# Patient Record
Sex: FEMALE | Race: WHITE | NOT HISPANIC OR LATINO | Employment: PART TIME | URBAN - METROPOLITAN AREA
[De-identification: names, ages, dates, MRNs, and addresses within clinical notes are randomized per-mention and may not be internally consistent; named-entity substitution may affect disease eponyms.]

---

## 2018-05-03 ENCOUNTER — OFFICE VISIT (OUTPATIENT)
Dept: GASTROENTEROLOGY | Facility: CLINIC | Age: 73
End: 2018-05-03
Payer: MEDICARE

## 2018-05-03 ENCOUNTER — TELEPHONE (OUTPATIENT)
Dept: GASTROENTEROLOGY | Facility: CLINIC | Age: 73
End: 2018-05-03

## 2018-05-03 VITALS
DIASTOLIC BLOOD PRESSURE: 73 MMHG | WEIGHT: 172.38 LBS | BODY MASS INDEX: 30.54 KG/M2 | SYSTOLIC BLOOD PRESSURE: 156 MMHG | HEIGHT: 63 IN | HEART RATE: 76 BPM

## 2018-05-03 DIAGNOSIS — R13.14 DYSPHAGIA, PHARYNGOESOPHAGEAL: Primary | ICD-10-CM

## 2018-05-03 PROCEDURE — 99204 OFFICE O/P NEW MOD 45 MIN: CPT | Mod: S$GLB,,, | Performed by: INTERNAL MEDICINE

## 2018-05-03 PROCEDURE — 99999 PR PBB SHADOW E&M-NEW PATIENT-LVL III: CPT | Mod: PBBFAC,,, | Performed by: INTERNAL MEDICINE

## 2018-05-03 NOTE — PROGRESS NOTES
Subjective:      Patient ID: Karin Alliosn is a 72 y.o. female.    Chief Complaint: Dysphagia    HPI:   Patient is 72-year-old female referred for GI evaluation.  She gives a history of intermittent solid food dysphagia for some time.  Heartburn is occasional only.  On no acid reducing agents.  GI systems review otherwise negative.  No prior upper endoscopy.  Indicates she had a colonoscopy in 2014 does not recall the physician's name.  Past medical history includes breast cancer treated with surgery, chemotherapy and radiation in 2014.   Hypothyroidism.  Prior hysterectomy.  Nonsmoker.  Nondrinker.  Family history negative for GI neoplasm.    Review of patient's allergies indicates:  No Known Allergies  Past Medical History:   Diagnosis Date    Breast cancer, right breast     Cancer     Lymphedema     Thyroid disease      Past Surgical History:   Procedure Laterality Date    BREAST SURGERY      MASTECTOMY       Family History   Problem Relation Age of Onset    No Known Problems Mother     No Known Problems Father      Social History     Social History    Marital status:      Spouse name: N/A    Number of children: N/A    Years of education: N/A     Occupational History    Not on file.     Social History Main Topics    Smoking status: Never Smoker    Smokeless tobacco: Not on file    Alcohol use 1.0 oz/week     2 drink(s) per week      Comment: occasionally    Drug use: No    Sexual activity: Not on file     Other Topics Concern    Not on file     Social History Narrative    No narrative on file       Review of Systems:  Constitutional: Negative for appetite change.   HENT: Negative for trouble swallowing.   Eyes: Negative for photophobia.   Respiratory: Negative for cough and shortness of breath.   Cardiovascular: Negative for palpitations.   Gastrointestinal: See HPI for details.  Genitourinary: Negative for frequency and hematuria.   Skin: Negative for rash.   Neurological: Negative  "for weakness and headaches.   Hematological: Negative.   Psychiatric/Behavioral: Negative for suicidal ideas and behavioral problems.     Objective:     BP (!) 156/73 (BP Location: Right arm, Patient Position: Sitting)   Pulse 76   Ht 5' 3" (1.6 m)   Wt 78.2 kg (172 lb 6.4 oz)   BMI 30.54 kg/m²     Physical Exam:  Alert no distress.  A limited historian.  Eyes: Pupils are equal, round, and reactive to light.   Neck: Supple. No mass  Cardiovascular: Regular rhythm . No murmur   Pulmonary/Chest: Lungs clear   Abdominal: Soft. No mass palpated. Nontender, no guarding. Positive bowel sounds   Musculoskeletal: No deformity. No edema.   Psychiatric: Alert and oriented    Assessment:     1. Dysphagia, pharyngoesophageal      Plan:     Karin was seen today for dysphagia.    Diagnoses and all orders for this visit:    Dysphagia, pharyngoesophageal  -     ranitidine (ZANTAC) 300 MG tablet; Take 1 tablet (300 mg total) by mouth every evening.  -     Case request GI: ESOPHAGOGASTRODUODENOSCOPY (EGD)      Plan:  Empiric H2 blocker  EGD with esophageal biopsy and dilatation  Patient requests to wait until next month    CC Dr. Bey    "

## 2018-05-03 NOTE — TELEPHONE ENCOUNTER
Patient is scheduled for an EGD at [facility] on 6/18/18,prep instructions was explained and given at office visit. Case request was placed by  and Jose G was notified to had pt in the book.

## 2018-05-03 NOTE — PATIENT INSTRUCTIONS
Treating Dysphagia     Talk to your healthcare provider before you take any medicines.    A medical evaluation helps your healthcare provider find the cause of your trouble swallowing, or dysphagia. Your evaluation may include a medical history and some special tests. Your provider will make a treatment plan based on the results of your evaluation. You may need to take medicines. In some cases, your provider may suggest a procedure to stretch or widen your esophagus (esophageal dilation). Or your provider may suggest surgery.  What you can do  To help control dysphagia, follow your treatment plan. Take all medicines as directed. You also can help lessen your dysphagia symptoms by being careful about what and how you eat.  Medicines  You may need medicines, such as those that:  · Reduce or neutralize stomach acids  · Control esophagus muscle spasms  · Treat an allergic disorder of the esophagus that is causing the problem  · Are injected into the esophagus to help symptoms  Esophageal dilation  Dilation is a procedure that your provider can use to widen your esophagus. It is most often done when a narrowing (stricture) of the esophagus is causing the problem. There are many ways your provider can widen your esophagus. He or she can discuss them with you.  Eating tips  · Eat slowly in a relaxed setting.  · Dont talk while you eat.  · Take small bites and chew slowly and thoroughly  · Sit in an upright position during and after meals.  · Ask your provider about any special diets that may help, such as liquid diets.  · If you have trouble swallowing solid foods, you can use a  to mash or purée them.  · Thicken liquids with milk, juice, broth, gravy, or starch to make swallowing easier.  Occupational or speech therapy  Your healthcare provider may recommend that you have an evaluation or sessions with a speech or occupational therapist. These specialists in dysphagia may give you exercises and instructions to  help you eat safely.   Date Last Reviewed: 10/1/2016  © 7253-2616 [employer] [address] [place] [address], [place] [address]. All rights reserved. This information is not intended as a substitute for professional medical care. Always follow your healthcare professional's instructions.

## 2018-06-06 ENCOUNTER — TELEPHONE (OUTPATIENT)
Dept: GASTROENTEROLOGY | Facility: CLINIC | Age: 73
End: 2018-06-06

## 2018-06-06 NOTE — TELEPHONE ENCOUNTER
----- Message from Iliana Ac MA sent at 6/6/2018  2:56 PM CDT -----  Contact: [phone]/self  Pt was scheduled for the 18th , tried calling back to rescheduled but was not able to reach pt. Can you please CX. thanks  ----- Message -----  From: Monique Tafoya  Sent: 6/6/2018   2:41 PM  To: Nayana Naranjo ([place])    Patient is requesting a call back regarding canceling her procedure. She recently had rotator cuff surgery and would like to reschedule later next month. Please advise.

## 2021-01-14 ENCOUNTER — IMMUNIZATION (OUTPATIENT)
Dept: INTERNAL MEDICINE | Facility: CLINIC | Age: 76
End: 2021-01-14
Payer: MEDICARE

## 2021-01-14 DIAGNOSIS — Z23 NEED FOR VACCINATION: ICD-10-CM

## 2021-01-14 PROCEDURE — 91300 COVID-19, MRNA, LNP-S, PF, 30 MCG/0.3 ML DOSE VACCINE: CPT | Mod: PBBFAC | Performed by: INTERNAL MEDICINE

## 2021-02-06 ENCOUNTER — IMMUNIZATION (OUTPATIENT)
Dept: INTERNAL MEDICINE | Facility: CLINIC | Age: 76
End: 2021-02-06
Payer: MEDICARE

## 2021-02-06 DIAGNOSIS — Z23 NEED FOR VACCINATION: Primary | ICD-10-CM

## 2021-02-06 PROCEDURE — 0002A COVID-19, MRNA, LNP-S, PF, 30 MCG/0.3 ML DOSE VACCINE: CPT | Mod: CV19,,, | Performed by: INTERNAL MEDICINE

## 2021-02-06 PROCEDURE — 91300 COVID-19, MRNA, LNP-S, PF, 30 MCG/0.3 ML DOSE VACCINE: ICD-10-PCS | Mod: ,,, | Performed by: INTERNAL MEDICINE

## 2021-02-06 PROCEDURE — 0002A COVID-19, MRNA, LNP-S, PF, 30 MCG/0.3 ML DOSE VACCINE: ICD-10-PCS | Mod: CV19,,, | Performed by: INTERNAL MEDICINE

## 2021-02-06 PROCEDURE — 91300 COVID-19, MRNA, LNP-S, PF, 30 MCG/0.3 ML DOSE VACCINE: CPT | Mod: ,,, | Performed by: INTERNAL MEDICINE

## 2022-01-02 ENCOUNTER — HOSPITAL ENCOUNTER (EMERGENCY)
Facility: HOSPITAL | Age: 77
Discharge: HOME | End: 2022-01-02
Attending: EMERGENCY MEDICINE
Payer: MEDICARE

## 2022-01-02 VITALS
RESPIRATION RATE: 18 BRPM | SYSTOLIC BLOOD PRESSURE: 160 MMHG | TEMPERATURE: 98 F | HEART RATE: 88 BPM | DIASTOLIC BLOOD PRESSURE: 72 MMHG | OXYGEN SATURATION: 99 %

## 2022-01-02 DIAGNOSIS — Z71.89 ADVICE GIVEN ABOUT COVID-19 VIRUS INFECTION: Primary | ICD-10-CM

## 2022-01-02 PROCEDURE — 99281 EMR DPT VST MAYX REQ PHY/QHP: CPT | Mod: ER

## 2022-01-02 NOTE — ED PROVIDER NOTES
"Encounter Date: 1/2/2022       History     Chief Complaint   Patient presents with    COVID-19 Concerns     "My  has a sore throat and I thought maybe I should get a covid test" Denies any symptoms at this time     HPI: Karin Allison, a 76 y.o. female  has a past medical history of Breast cancer, right breast, Cancer, Lymphedema, and Thyroid disease.     She presents to the ED requesting covid test.  States that her  has a sore throat and he is also checking in for same concern.  She denies symptoms.  She is vaccinated without booster.          The history is provided by the patient.     Review of patient's allergies indicates:  No Known Allergies  Past Medical History:   Diagnosis Date    Breast cancer, right breast     Cancer     Lymphedema     Thyroid disease      Past Surgical History:   Procedure Laterality Date    BREAST SURGERY      MASTECTOMY       Family History   Problem Relation Age of Onset    No Known Problems Mother     No Known Problems Father      Social History     Tobacco Use    Smoking status: Never Smoker   Substance Use Topics    Alcohol use: Yes     Alcohol/week: 1.7 standard drinks     Types: 2 drink(s) per week     Comment: occasionally    Drug use: No     Review of Systems   Constitutional: Negative for fever.   HENT: Negative for congestion.    Respiratory: Negative for cough.    Cardiovascular: Negative for chest pain.   Musculoskeletal: Negative for myalgias.   Skin: Negative for color change.   Allergic/Immunologic: Negative for immunocompromised state.   All other systems reviewed and are negative.      Physical Exam     Initial Vitals [01/02/22 1527]   BP Pulse Resp Temp SpO2   (!) 160/72 88 18 98.1 °F (36.7 °C) 99 %      MAP       --         Physical Exam    Nursing note and vitals reviewed.  Constitutional: She appears well-developed and well-nourished. She is not diaphoretic. No distress.   HENT:   Head: Normocephalic and atraumatic.   Right Ear: External " ear normal.   Left Ear: External ear normal.   Nose: Nose normal.   Eyes: Conjunctivae and EOM are normal.   Neck:   Normal range of motion.  Cardiovascular: Normal rate and regular rhythm.   Pulmonary/Chest: No respiratory distress.   Musculoskeletal:         General: Normal range of motion.      Cervical back: Normal range of motion.     Neurological: She is alert and oriented to person, place, and time.   Skin: No rash noted.   Psychiatric: She has a normal mood and affect. Thought content normal.         ED Course   Procedures  Labs Reviewed - No data to display       Imaging Results    None          Medications - No data to display  Medical Decision Making:   Initial Assessment:   Covid concern   ED Management:  Patient was seen in the Emergency Department with symptoms consistent with a viral respiratory illness.  Based on the most recent recommendations from our hospital administration/infectious disease department at this time, the patient does NOT meet criteria for COVID-19 testing. This was explained to the patient. Vital signs do not indicate sepsis, hypoxia nor respiratory distress, and in my professional opinion the patient is well enough for discharge home. The patient was provided with discharge instructions on self-care and how to quarantine at home. I reinforced this advice and the dangers to family and public with failure to comply. We will proceed with symptomatic treatment. The patient was also given a return to work note, if applicable. Return precautions discussed with the patient. The patient expressed understanding to my instructions.                         Clinical Impression:   Final diagnoses:  [Z71.89] Advice given about COVID-19 virus infection (Primary)          ED Disposition Condition    Discharge Stable        ED Prescriptions     None        Follow-up Information     Follow up With Specialties Details Why Contact Info    Julianne Bey MD Endocrinology   [address] [place] [address]  [address]  [facility] [address]  [phone]             María Solis PA-C  01/02/22 7910

## 2022-10-01 ENCOUNTER — HOSPITAL ENCOUNTER (EMERGENCY)
Facility: HOSPITAL | Age: 77
Discharge: HOME | End: 2022-10-01
Attending: EMERGENCY MEDICINE
Payer: MEDICARE

## 2022-10-01 VITALS
SYSTOLIC BLOOD PRESSURE: 175 MMHG | HEART RATE: 76 BPM | RESPIRATION RATE: 18 BRPM | TEMPERATURE: 98 F | WEIGHT: 162 LBS | BODY MASS INDEX: 29.81 KG/M2 | DIASTOLIC BLOOD PRESSURE: 75 MMHG | HEIGHT: 62 IN | OXYGEN SATURATION: 99 %

## 2022-10-01 DIAGNOSIS — R58 ECCHYMOSIS: ICD-10-CM

## 2022-10-01 DIAGNOSIS — M79.605 LEFT LEG PAIN: Primary | ICD-10-CM

## 2022-10-01 DIAGNOSIS — M79.606 LEG PAIN: ICD-10-CM

## 2022-10-01 LAB
ALBUMIN SERPL BCP-MCNC: 4 G/DL (ref 3.5–5.2)
ALP SERPL-CCNC: 81 U/L (ref 38–126)
ALT SERPL W/O P-5'-P-CCNC: 17 U/L (ref 10–44)
ANION GAP SERPL CALC-SCNC: 8 MMOL/L (ref 8–16)
APTT BLDCRRT: 25.9 SEC (ref 21–32)
AST SERPL-CCNC: 18 U/L (ref 15–46)
BASOPHILS # BLD AUTO: 0.04 K/UL (ref 0–0.2)
BASOPHILS NFR BLD: 0.5 % (ref 0–1.9)
BILIRUB SERPL-MCNC: 0.6 MG/DL (ref 0.1–1)
CALCIUM SERPL-MCNC: 9 MG/DL (ref 8.7–10.5)
CHLORIDE SERPL-SCNC: 106 MMOL/L (ref 95–110)
CO2 SERPL-SCNC: 26 MMOL/L (ref 23–29)
CREAT SERPL-MCNC: 0.78 MG/DL (ref 0.5–1.4)
DIFFERENTIAL METHOD: ABNORMAL
EOSINOPHIL # BLD AUTO: 0.3 K/UL (ref 0–0.5)
EOSINOPHIL NFR BLD: 3.2 % (ref 0–8)
ERYTHROCYTE [DISTWIDTH] IN BLOOD BY AUTOMATED COUNT: 12.8 % (ref 11.5–14.5)
EST. GFR  (NO RACE VARIABLE): >60 ML/MIN/1.73 M^2
GLUCOSE SERPL-MCNC: 130 MG/DL (ref 70–110)
HCT VFR BLD AUTO: 34.4 % (ref 37–48.5)
HGB BLD-MCNC: 11 G/DL (ref 12–16)
IMM GRANULOCYTES # BLD AUTO: 0.02 K/UL (ref 0–0.04)
IMM GRANULOCYTES NFR BLD AUTO: 0.3 % (ref 0–0.5)
INR PPP: 1 (ref 0.8–1.2)
LYMPHOCYTES # BLD AUTO: 1.5 K/UL (ref 1–4.8)
LYMPHOCYTES NFR BLD: 19.3 % (ref 18–48)
MCH RBC QN AUTO: 29.5 PG (ref 27–31)
MCHC RBC AUTO-ENTMCNC: 32 G/DL (ref 32–36)
MCV RBC AUTO: 92 FL (ref 82–98)
MONOCYTES # BLD AUTO: 0.8 K/UL (ref 0.3–1)
MONOCYTES NFR BLD: 9.5 % (ref 4–15)
NEUTROPHILS # BLD AUTO: 5.3 K/UL (ref 1.8–7.7)
NEUTROPHILS NFR BLD: 67.2 % (ref 38–73)
NRBC BLD-RTO: 0 /100 WBC
PLATELET # BLD AUTO: 302 K/UL (ref 150–450)
PMV BLD AUTO: 10.2 FL (ref 9.2–12.9)
POTASSIUM SERPL-SCNC: 4.1 MMOL/L (ref 3.5–5.1)
PROT SERPL-MCNC: 7 G/DL (ref 6–8.4)
PROTHROMBIN TIME: 10.5 SEC (ref 9–12.5)
RBC # BLD AUTO: 3.73 M/UL (ref 4–5.4)
SODIUM SERPL-SCNC: 140 MMOL/L (ref 136–145)
UUN UR-MCNC: 30 MG/DL (ref 7–17)
WBC # BLD AUTO: 7.89 K/UL (ref 3.9–12.7)

## 2022-10-01 PROCEDURE — 85025 COMPLETE CBC W/AUTO DIFF WBC: CPT | Mod: ER | Performed by: EMERGENCY MEDICINE

## 2022-10-01 PROCEDURE — 85730 THROMBOPLASTIN TIME PARTIAL: CPT | Mod: ER | Performed by: EMERGENCY MEDICINE

## 2022-10-01 PROCEDURE — 80053 COMPREHEN METABOLIC PANEL: CPT | Mod: ER | Performed by: EMERGENCY MEDICINE

## 2022-10-01 PROCEDURE — 99284 EMERGENCY DEPT VISIT MOD MDM: CPT | Mod: 25,ER

## 2022-10-01 PROCEDURE — 85610 PROTHROMBIN TIME: CPT | Mod: ER | Performed by: EMERGENCY MEDICINE

## 2022-10-01 NOTE — ED PROVIDER NOTES
Encounter Date: 10/1/2022       History     Chief Complaint   Patient presents with    Leg Pain     Pt c/o LLE pain x 1 week. Bruising noted to LLE from knee ankle, pt states began on Sunday. Denies injury.      76-year-old female presents with left leg pain since Monday.  Patient noticed significant bruising to the left leg around that time.  No known trauma.  Patient was seen in urgent care Wednesday and x-rays were reportedly normal.  Patient continues to have pain.  She denies chest pain or shortness of breath, bruising in other locations.  No history of bleeding disorders.  Patient is not anticoagulated.    Review of patient's allergies indicates:  No Known Allergies  Past Medical History:   Diagnosis Date    Breast cancer, right breast     Cancer     Lymphedema     Thyroid disease      Past Surgical History:   Procedure Laterality Date    BREAST SURGERY      MASTECTOMY       Family History   Problem Relation Age of Onset    No Known Problems Mother     No Known Problems Father      Social History     Tobacco Use    Smoking status: Never   Substance Use Topics    Alcohol use: Yes     Alcohol/week: 1.7 standard drinks     Types: 2 drink(s) per week     Comment: occasionally    Drug use: No     Review of Systems   Constitutional:  Negative for fever.   Eyes:  Negative for pain.   Respiratory:  Negative for shortness of breath.    Cardiovascular:  Negative for chest pain.   Gastrointestinal:  Negative for abdominal pain, nausea and vomiting.   Genitourinary:  Negative for difficulty urinating.   Musculoskeletal:  Negative for back pain and neck pain.   Neurological:  Negative for headaches.   Hematological:  Bruises/bleeds easily.   Psychiatric/Behavioral:  Negative for confusion.      Physical Exam     Initial Vitals [10/01/22 1021]   BP Pulse Resp Temp SpO2   (!) 141/65 93 18 98.4 °F (36.9 °C) 100 %      MAP       --         Physical Exam    Nursing note and vitals reviewed.  HENT:   Head: Atraumatic.   Eyes:  EOM are normal.   Neck:   Normal range of motion.  Cardiovascular:  Intact distal pulses.     Exam reveals no gallop and no friction rub.       No murmur heard.  Pulmonary/Chest: Breath sounds normal. No respiratory distress. She has no wheezes. She has no rhonchi. She has no rales.   Abdominal: Abdomen is soft. Bowel sounds are normal. She exhibits no distension. There is no abdominal tenderness.   Musculoskeletal:         General: Normal range of motion.      Cervical back: Normal range of motion.     Neurological: She is alert and oriented to person, place, and time.   Skin:        Ecchymosis and tenderness to the left mid leg without deformity   Psychiatric: She has a normal mood and affect.       ED Course   Procedures  Labs Reviewed   CBC W/ AUTO DIFFERENTIAL - Abnormal; Notable for the following components:       Result Value    RBC 3.73 (*)     Hemoglobin 11.0 (*)     Hematocrit 34.4 (*)     All other components within normal limits   COMPREHENSIVE METABOLIC PANEL - Abnormal; Notable for the following components:    Glucose 130 (*)     BUN 30 (*)     All other components within normal limits   PROTIME-INR   APTT          Imaging Results              US Lower Extremity Veins Left (Final result)  Result time 10/01/22 11:57:46      Final result by Vincent Graves MD (10/01/22 11:57:46)                   Impression:      No evidence of deep venous thrombosis in the left lower extremity.    Pulsatile venous flow may correlate with elevated right heart pressures.      Electronically signed by: Vincent Graves  Date:    10/01/2022  Time:    11:57               Narrative:    EXAMINATION:  US LOWER EXTREMITY VEINS LEFT    CLINICAL HISTORY:  Pain in leg, unspecified    TECHNIQUE:  Duplex and color flow Doppler evaluation and graded compression of the left lower extremity veins was performed.    COMPARISON:  None    FINDINGS:  Left thigh veins: The common femoral, femoral, popliteal, upper greater saphenous, and deep  femoral veins are patent and free of thrombus. The veins are normally compressible.  There is pulsatile flow.    Left calf veins: The visualized calf veins are patent.    Contralateral CFV: The contralateral (right) common femoral vein is patent and free of thrombus.    Miscellaneous: None                                       Medications - No data to display  Medical Decision Making:   Initial Assessment:   76-year-old female presenting for bruising/pain to the left leg.  No known trauma.  X-rays recently normal.  Pulses intact.  Patient has ecchymosis noted to the left mid lower leg with mild tenderness.  No bony deformities.  DVT study negative.  Labs show no significant anemia, thrombocytopenia or coagulopathy.  Etiology of bruising is not clear at this time.  Recommend supportive care and follow up with primary care.  May need repeat ultrasound if symptoms persist.                        Clinical Impression:   Final diagnoses:  [M79.606] Leg pain  [M79.605] Left leg pain (Primary)  [R58] Ecchymosis        ED Disposition Condition    Discharge Stable          ED Prescriptions    None       Follow-up Information       Follow up With Specialties Details Why Contact Info    Julianne Bey MD Endocrinology Schedule an appointment as soon as possible for a visit   [address] [facility] [address]  [facility] [address]  [phone]               Zhou Enriquez MD  10/01/22 9763

## 2022-10-01 NOTE — ED NOTES
Attempted to obtain IV access on left AC and left forearm, unsuccessful. Pt tolerated well. Sites covered with gauze and coband. No redness swelling or drainage noted.

## 2024-06-09 ENCOUNTER — HOSPITAL ENCOUNTER (EMERGENCY)
Facility: HOSPITAL | Age: 79
Discharge: HOME | End: 2024-06-10
Attending: EMERGENCY MEDICINE

## 2024-06-09 DIAGNOSIS — R00.0 SINUS TACHYCARDIA: Primary | ICD-10-CM

## 2024-06-09 DIAGNOSIS — R00.2 PALPITATIONS: ICD-10-CM

## 2024-06-09 PROCEDURE — 99285 EMERGENCY DEPT VISIT HI MDM: CPT | Mod: 25,ER

## 2024-06-09 PROCEDURE — 93010 ELECTROCARDIOGRAM REPORT: CPT | Mod: ,,, | Performed by: INTERNAL MEDICINE

## 2024-06-09 PROCEDURE — 93005 ELECTROCARDIOGRAM TRACING: CPT | Mod: ER

## 2024-06-09 RX ORDER — LYSINE HCL 500 MG
2 TABLET ORAL
COMMUNITY

## 2024-06-09 RX ORDER — FOSINOPIRL SODIUM 10 MG/1
10 TABLET ORAL
COMMUNITY
Start: 2024-05-23

## 2024-06-10 VITALS
BODY MASS INDEX: 28.7 KG/M2 | HEIGHT: 63 IN | SYSTOLIC BLOOD PRESSURE: 163 MMHG | RESPIRATION RATE: 18 BRPM | DIASTOLIC BLOOD PRESSURE: 73 MMHG | OXYGEN SATURATION: 100 % | HEART RATE: 92 BPM | WEIGHT: 162 LBS | TEMPERATURE: 99 F

## 2024-06-10 LAB
ALBUMIN SERPL BCP-MCNC: 4.1 G/DL (ref 3.5–5.2)
ALP SERPL-CCNC: 125 U/L (ref 38–126)
ALT SERPL W/O P-5'-P-CCNC: 19 U/L (ref 10–44)
AMPHET+METHAMPHET UR QL: NEGATIVE
ANION GAP SERPL CALC-SCNC: 9 MMOL/L (ref 8–16)
AST SERPL-CCNC: 22 U/L (ref 15–46)
BARBITURATES UR QL SCN>200 NG/ML: NEGATIVE
BASOPHILS # BLD AUTO: 0.03 K/UL (ref 0–0.2)
BASOPHILS NFR BLD: 0.4 % (ref 0–1.9)
BENZODIAZ UR QL SCN>200 NG/ML: NEGATIVE
BILIRUB SERPL-MCNC: 0.3 MG/DL (ref 0.1–1)
BZE UR QL SCN: NEGATIVE
CALCIUM SERPL-MCNC: 9.1 MG/DL (ref 8.7–10.5)
CANNABINOIDS UR QL SCN: NEGATIVE
CHLORIDE SERPL-SCNC: 107 MMOL/L (ref 95–110)
CO2 SERPL-SCNC: 25 MMOL/L (ref 23–29)
CREAT SERPL-MCNC: 0.85 MG/DL (ref 0.5–1.4)
CREAT UR-MCNC: 12.2 MG/DL (ref 15–325)
D DIMER PPP IA.FEU-MCNC: 0.6 MG/L FEU
DIFFERENTIAL METHOD BLD: NORMAL
EOSINOPHIL # BLD AUTO: 0.3 K/UL (ref 0–0.5)
EOSINOPHIL NFR BLD: 4.5 % (ref 0–8)
ERYTHROCYTE [DISTWIDTH] IN BLOOD BY AUTOMATED COUNT: 13.6 % (ref 11.5–14.5)
EST. GFR  (NO RACE VARIABLE): >60 ML/MIN/1.73 M^2
ETHANOL SERPL-MCNC: <10 MG/DL
GLUCOSE SERPL-MCNC: 113 MG/DL (ref 70–110)
HCT VFR BLD AUTO: 38.9 % (ref 37–48.5)
HGB BLD-MCNC: 12.6 G/DL (ref 12–16)
IMM GRANULOCYTES # BLD AUTO: 0.01 K/UL (ref 0–0.04)
IMM GRANULOCYTES NFR BLD AUTO: 0.1 % (ref 0–0.5)
LYMPHOCYTES # BLD AUTO: 2.6 K/UL (ref 1–4.8)
LYMPHOCYTES NFR BLD: 37.3 % (ref 18–48)
MCH RBC QN AUTO: 29.2 PG (ref 27–31)
MCHC RBC AUTO-ENTMCNC: 32.4 G/DL (ref 32–36)
MCV RBC AUTO: 90 FL (ref 82–98)
METHADONE UR QL SCN>300 NG/ML: NEGATIVE
MONOCYTES # BLD AUTO: 0.7 K/UL (ref 0.3–1)
MONOCYTES NFR BLD: 10.2 % (ref 4–15)
NEUTROPHILS # BLD AUTO: 3.4 K/UL (ref 1.8–7.7)
NEUTROPHILS NFR BLD: 47.5 % (ref 38–73)
NRBC BLD-RTO: 0 /100 WBC
NT-PROBNP SERPL-MCNC: 518 PG/ML (ref 5–1800)
OPIATES UR QL SCN: NEGATIVE
PCP UR QL SCN>25 NG/ML: NEGATIVE
PLATELET # BLD AUTO: 295 K/UL (ref 150–450)
PMV BLD AUTO: 10.2 FL (ref 9.2–12.9)
POTASSIUM SERPL-SCNC: 4.3 MMOL/L (ref 3.5–5.1)
PROT SERPL-MCNC: 7.7 G/DL (ref 6–8.4)
RBC # BLD AUTO: 4.31 M/UL (ref 4–5.4)
SODIUM SERPL-SCNC: 141 MMOL/L (ref 136–145)
T4 FREE SERPL-MCNC: 1.23 NG/DL (ref 0.71–1.51)
TOXICOLOGY INFORMATION: ABNORMAL
TROPONIN I SERPL-MCNC: 0.02 NG/ML (ref 0.01–0.03)
TSH SERPL DL<=0.005 MIU/L-ACNC: 0.12 UIU/ML (ref 0.4–4)
UUN UR-MCNC: 28 MG/DL (ref 7–17)
WBC # BLD AUTO: 7.07 K/UL (ref 3.9–12.7)

## 2024-06-10 PROCEDURE — 25000003 PHARM REV CODE 250: Mod: ER | Performed by: EMERGENCY MEDICINE

## 2024-06-10 PROCEDURE — 84439 ASSAY OF FREE THYROXINE: CPT | Performed by: EMERGENCY MEDICINE

## 2024-06-10 PROCEDURE — 85025 COMPLETE CBC W/AUTO DIFF WBC: CPT | Mod: ER | Performed by: EMERGENCY MEDICINE

## 2024-06-10 PROCEDURE — 96360 HYDRATION IV INFUSION INIT: CPT | Mod: ER

## 2024-06-10 PROCEDURE — 84443 ASSAY THYROID STIM HORMONE: CPT | Mod: ER | Performed by: EMERGENCY MEDICINE

## 2024-06-10 PROCEDURE — 25500020 PHARM REV CODE 255: Mod: ER | Performed by: EMERGENCY MEDICINE

## 2024-06-10 PROCEDURE — 80053 COMPREHEN METABOLIC PANEL: CPT | Mod: ER | Performed by: EMERGENCY MEDICINE

## 2024-06-10 PROCEDURE — 84484 ASSAY OF TROPONIN QUANT: CPT | Mod: ER | Performed by: EMERGENCY MEDICINE

## 2024-06-10 PROCEDURE — 85379 FIBRIN DEGRADATION QUANT: CPT | Mod: ER | Performed by: EMERGENCY MEDICINE

## 2024-06-10 PROCEDURE — 82077 ASSAY SPEC XCP UR&BREATH IA: CPT | Mod: ER | Performed by: EMERGENCY MEDICINE

## 2024-06-10 PROCEDURE — 80307 DRUG TEST PRSMV CHEM ANLYZR: CPT | Mod: ER | Performed by: EMERGENCY MEDICINE

## 2024-06-10 PROCEDURE — 83880 ASSAY OF NATRIURETIC PEPTIDE: CPT | Mod: ER | Performed by: EMERGENCY MEDICINE

## 2024-06-10 RX ADMIN — IOHEXOL 100 ML: 350 INJECTION, SOLUTION INTRAVENOUS at 02:06

## 2024-06-10 RX ADMIN — SODIUM CHLORIDE 500 ML: 9 INJECTION, SOLUTION INTRAVENOUS at 12:06

## 2024-06-10 NOTE — ED PROVIDER NOTES
"ED Provider Note - 6/9/2024    History     Chief Complaint   Patient presents with    Palpitations     Pt to the Er states she, "could feel her heart beating." Spouse checked BP at home and it was 160's. Denies chest pain or SOB     Patient currently presents with concern regarding palpitations.  Onset was 1st noted just prior to arrival.  Symptoms are described as racing.  There is not associated CP.  There is associated SOB.  This is not a recurrent event.  Patient denies history of SVT or atrial fibrillation.  Patient does have a history of hypothyroidism for which she takes thyroid replacement.  Patient has a remote history of breast cancer but is no longer actively undergoing treatment.  Patient does note that she drank a few martinis earlier this evening and does drink tea regularly throughout the day.        Review of patient's allergies indicates:   Allergen Reactions    Peanut Other (See Comments)     Past Medical History:   Diagnosis Date    Breast cancer, right breast     Cancer     Lymphedema     Thyroid disease      Past Surgical History:   Procedure Laterality Date    BREAST SURGERY      MASTECTOMY       Family History   Problem Relation Name Age of Onset    No Known Problems Mother      No Known Problems Father       Social History     Tobacco Use    Smoking status: Never   Substance Use Topics    Alcohol use: Yes     Alcohol/week: 1.7 standard drinks of alcohol     Types: 2 drink(s) per week     Comment: occasionally    Drug use: No     Review of Systems   Constitutional:  Negative for chills and fever.   HENT:  Negative for congestion and rhinorrhea.    Respiratory:  Positive for shortness of breath. Negative for cough.    Cardiovascular:  Positive for palpitations. Negative for chest pain.   Gastrointestinal:  Negative for abdominal pain, diarrhea and vomiting.   Genitourinary:  Negative for difficulty urinating and dysuria.   Skin:  Negative for color change and rash.   Neurological:  Negative for " "dizziness and light-headedness.   Hematological:  Negative for adenopathy. Does not bruise/bleed easily.       Physical Exam     Initial Vitals [06/09/24 2349]   BP Pulse Resp Temp SpO2   (!) 178/95 (!) 156 20 97.9 °F (36.6 °C) 99 %      MAP       --         Vitals:    06/09/24 2349 06/10/24 0142 06/10/24 0217 06/10/24 0301   BP: (!) 178/95  (!) 199/89 (!) 185/74   Pulse: (!) 156 88 100 81   Resp: 20 (!) 21 15 16   Temp: 97.9 °F (36.6 °C)      TempSrc: Oral      SpO2: 99% 99% 100% 99%   Weight: 73.5 kg (162 lb)      Height: 5' 3" (1.6 m)       06/10/24 0322   BP: (!) 163/73   Pulse: 92   Resp: 18   Temp: 98.7 °F (37.1 °C)   TempSrc:    SpO2: 100%   Weight:    Height:      Physical Exam    Nursing note and vitals reviewed.  Constitutional: She appears well-developed and well-nourished. She is not diaphoretic. No distress.   HENT:   Head: Normocephalic and atraumatic.   Nose: Nose normal.   Mouth/Throat: Oropharynx is clear and moist.   Eyes: Conjunctivae are normal. No scleral icterus.   Cardiovascular:  Regular rhythm, normal heart sounds and intact distal pulses.   Tachycardia present.         Pulmonary/Chest: Breath sounds normal. No respiratory distress.   Abdominal: Abdomen is soft. There is no abdominal tenderness.   Musculoskeletal:         General: No edema. Normal range of motion.     Neurological: She is alert and oriented to person, place, and time. She has normal strength.   Skin: Skin is warm and dry.       ED Course   Procedures                   MDM  Differential Diagnoses   Based on available history, the working differential diagnoses considered during this evaluation include but are not limited to SVT, atrial fibrillation with RVR, sinus tachycardia, V-tach, pseudo palpitations, pulmonary embolus.      LABS     Labs Reviewed   COMPREHENSIVE METABOLIC PANEL - Abnormal; Notable for the following components:       Result Value    Glucose 113 (*)     BUN 28 (*)     All other components within normal " limits   DRUG SCREEN PANEL, URINE EMERGENCY - Abnormal; Notable for the following components:    Creatinine, Urine 12.2 (*)     All other components within normal limits    Narrative:     Specimen Source->Urine   TSH - Abnormal; Notable for the following components:    TSH 0.125 (*)     All other components within normal limits   D DIMER, QUANTITATIVE - Abnormal; Notable for the following components:    D-Dimer 0.60 (*)     All other components within normal limits   CBC W/ AUTO DIFFERENTIAL   TROPONIN I   NT-PRO NATRIURETIC PEPTIDE   ALCOHOL,MEDICAL (ETHANOL)   T4, FREE           All available results from the labs ordered were independently reviewed. with findings as follows:  CMP unremarkable aside from elevated BUN of 28.  CBC unremarkable.  Troponin level negative.  D-dimer positive at 0.60.  TSH level low at 0.125 though the T4 level is normal at 1.23.  Natriuretic peptide unremarkable.  Toxicology screen unremarkable.  Alcohol level unremarkable.      Imaging     Imaging Results              CTA Chest Non-Coronary (PE Studies) (In process)                      X-Ray Chest AP Portable (Final result)  Result time 06/10/24 01:56:13      Final result by Ellie Evangelista MD (06/10/24 01:56:13)                   Impression:      No acute findings.      Electronically signed by: Ellie Evangelista  Date:    06/10/2024  Time:    01:56               Narrative:    EXAMINATION:  XR CHEST AP PORTABLE    CLINICAL HISTORY:  Chest Pain;    TECHNIQUE:  Single frontal view of the chest was performed.    COMPARISON:  None    FINDINGS:  Lungs are clear. No focal consolidation. No pleural effusion. No pneumothorax. Normal heart size.  Status post right rotator cuff repair.  Surgical clips in the right axilla.                                       X-Rays:   Other Radiology Reports: Preliminary report for the CT of the chest shows no evidence of acute pulmonary embolism.        EKG   EKG Readings: (Independently Interpreted)    Initial Reading: No STEMI. Rhythm: Sinus Tachycardia. Heart Rate: 152. Ectopy: No Ectopy. Clinical Impression: Left Ventricular Hypertrophy (LDH)       ED Management/Discussion     Medications   sodium chloride 0.9% bolus 500 mL 500 mL (0 mLs Intravenous Stopped 6/10/24 0138)   iohexoL (OMNIPAQUE 350) injection 100 mL (100 mLs Intravenous Given 6/10/24 0206)                 The patient's list of active medical problems, social history, medications, and allergies as documented per RN staff has been reviewed.             CTA of the chest performed secondary to positive D-dimer.    On final assessment, the patient appears suitable for discharge.  CTA is unremarkable.  The patient's rhythm has spontaneously converted back to a normal sinus rhythm and has remained so now for greater than 2 hours.  Patient denies any active symptoms at this point.  We have not seen anything cardiac monitoring beyond sinus tachycardia.  Patient has been counseled regarding avoidance of stimulants/irritant such as alcohol, theophylline, or caffeine.      I see no indication of an emergent process beyond that addressed during our encounter but have duly counseled the patient/family regarding the need for prompt follow-up as well as the indications that should prompt immediate return to the emergency room.  The patient/family has been provided with language -specific verbal and printed direction regarding our final diagnosis(es) as well as instructions regarding use of OTC and/or Rx medications intended to manage the patient's aforementioned conditions including:  ED Prescriptions    None           Patient has been advised of the following recommended follow-up instructions:  Follow-up Information       Follow up With Specialties Details Why Contact Info    PCP  Schedule an appointment as soon as possible for a visit  for reassessment     [facility] Emergency Dept Emergency Medicine Go to  As needed, If symptoms worsen [address]  [facility] [address] [phone]          The patient/family communicates understanding of all this information and all remaining questions and concerns were addressed at this time.      Referrals:  No orders of the defined types were placed in this encounter.      CLINICAL IMPRESSION    ICD-10-CM ICD-9-CM   1. Sinus tachycardia  R00.0 427.89   2. Palpitations  R00.2 785.1          ED Disposition Condition    Discharge Stable                 Breezy Montoya MD  06/10/24 2222       Breezy Montoya MD  06/10/24 2224

## 2024-06-11 ENCOUNTER — HOSPITAL ENCOUNTER (EMERGENCY)
Facility: HOSPITAL | Age: 79
Discharge: HOME | End: 2024-06-12
Attending: EMERGENCY MEDICINE

## 2024-06-11 DIAGNOSIS — R00.0 SINUS TACHYCARDIA: Primary | ICD-10-CM

## 2024-06-11 DIAGNOSIS — R00.2 PALPITATIONS: ICD-10-CM

## 2024-06-11 PROCEDURE — 85025 COMPLETE CBC W/AUTO DIFF WBC: CPT | Mod: ER | Performed by: EMERGENCY MEDICINE

## 2024-06-11 PROCEDURE — 93005 ELECTROCARDIOGRAM TRACING: CPT | Mod: ER

## 2024-06-11 PROCEDURE — 84484 ASSAY OF TROPONIN QUANT: CPT | Mod: ER | Performed by: EMERGENCY MEDICINE

## 2024-06-11 PROCEDURE — 80053 COMPREHEN METABOLIC PANEL: CPT | Mod: ER | Performed by: EMERGENCY MEDICINE

## 2024-06-11 PROCEDURE — 93010 ELECTROCARDIOGRAM REPORT: CPT | Mod: ,,, | Performed by: INTERNAL MEDICINE

## 2024-06-11 PROCEDURE — 99284 EMERGENCY DEPT VISIT MOD MDM: CPT | Mod: 25,ER

## 2024-06-12 VITALS
DIASTOLIC BLOOD PRESSURE: 74 MMHG | OXYGEN SATURATION: 99 % | HEART RATE: 83 BPM | TEMPERATURE: 97 F | SYSTOLIC BLOOD PRESSURE: 164 MMHG | WEIGHT: 164 LBS | RESPIRATION RATE: 18 BRPM | HEIGHT: 63 IN | BODY MASS INDEX: 29.06 KG/M2

## 2024-06-12 LAB
ALBUMIN SERPL BCP-MCNC: 3.8 G/DL (ref 3.5–5.2)
ALP SERPL-CCNC: 95 U/L (ref 38–126)
ALT SERPL W/O P-5'-P-CCNC: 50 U/L (ref 10–44)
ANION GAP SERPL CALC-SCNC: 6 MMOL/L (ref 8–16)
AST SERPL-CCNC: 43 U/L (ref 15–46)
BASOPHILS # BLD AUTO: 0.05 K/UL (ref 0–0.2)
BASOPHILS NFR BLD: 0.5 % (ref 0–1.9)
BILIRUB SERPL-MCNC: 0.3 MG/DL (ref 0.1–1)
CALCIUM SERPL-MCNC: 9.4 MG/DL (ref 8.7–10.5)
CHLORIDE SERPL-SCNC: 107 MMOL/L (ref 95–110)
CO2 SERPL-SCNC: 26 MMOL/L (ref 23–29)
CREAT SERPL-MCNC: 0.76 MG/DL (ref 0.5–1.4)
DIFFERENTIAL METHOD BLD: ABNORMAL
EOSINOPHIL # BLD AUTO: 0.1 K/UL (ref 0–0.5)
EOSINOPHIL NFR BLD: 0.9 % (ref 0–8)
ERYTHROCYTE [DISTWIDTH] IN BLOOD BY AUTOMATED COUNT: 13.6 % (ref 11.5–14.5)
EST. GFR  (NO RACE VARIABLE): >60 ML/MIN/1.73 M^2
GLUCOSE SERPL-MCNC: 102 MG/DL (ref 70–110)
HCT VFR BLD AUTO: 34.4 % (ref 37–48.5)
HGB BLD-MCNC: 11.2 G/DL (ref 12–16)
IMM GRANULOCYTES # BLD AUTO: 0.03 K/UL (ref 0–0.04)
IMM GRANULOCYTES NFR BLD AUTO: 0.3 % (ref 0–0.5)
LYMPHOCYTES # BLD AUTO: 2.6 K/UL (ref 1–4.8)
LYMPHOCYTES NFR BLD: 25.5 % (ref 18–48)
MCH RBC QN AUTO: 28.9 PG (ref 27–31)
MCHC RBC AUTO-ENTMCNC: 32.6 G/DL (ref 32–36)
MCV RBC AUTO: 89 FL (ref 82–98)
MONOCYTES # BLD AUTO: 1.1 K/UL (ref 0.3–1)
MONOCYTES NFR BLD: 10.8 % (ref 4–15)
NEUTROPHILS # BLD AUTO: 6.4 K/UL (ref 1.8–7.7)
NEUTROPHILS NFR BLD: 62 % (ref 38–73)
NRBC BLD-RTO: 0 /100 WBC
OHS QRS DURATION: 74 MS
OHS QRS DURATION: 86 MS
OHS QTC CALCULATION: 423 MS
OHS QTC CALCULATION: 470 MS
PLATELET # BLD AUTO: 295 K/UL (ref 150–450)
PMV BLD AUTO: 10 FL (ref 9.2–12.9)
POTASSIUM SERPL-SCNC: 3.9 MMOL/L (ref 3.5–5.1)
PROT SERPL-MCNC: 7.1 G/DL (ref 6–8.4)
RBC # BLD AUTO: 3.88 M/UL (ref 4–5.4)
SODIUM SERPL-SCNC: 139 MMOL/L (ref 136–145)
TROPONIN I SERPL-MCNC: 0.02 NG/ML (ref 0.01–0.03)
UUN UR-MCNC: 31 MG/DL (ref 7–17)
WBC # BLD AUTO: 10.27 K/UL (ref 3.9–12.7)

## 2024-06-12 PROCEDURE — 25000003 PHARM REV CODE 250: Mod: ER | Performed by: EMERGENCY MEDICINE

## 2024-06-12 RX ORDER — METOPROLOL TARTRATE 50 MG/1
50 TABLET ORAL
Status: COMPLETED | OUTPATIENT
Start: 2024-06-12 | End: 2024-06-12

## 2024-06-12 RX ORDER — METOPROLOL TARTRATE 50 MG/1
50 TABLET ORAL
Status: DISCONTINUED | OUTPATIENT
Start: 2024-06-12 | End: 2024-06-12

## 2024-06-12 RX ORDER — METOPROLOL SUCCINATE 25 MG/1
25 TABLET, EXTENDED RELEASE ORAL DAILY
Qty: 30 TABLET | Refills: 0 | Status: SHIPPED | OUTPATIENT
Start: 2024-06-12 | End: 2024-07-12

## 2024-06-12 RX ADMIN — METOPROLOL TARTRATE 50 MG: 50 TABLET, FILM COATED ORAL at 02:06

## 2024-06-13 NOTE — ED PROVIDER NOTES
"ED Provider Note - 6/11/2024    History     Chief Complaint   Patient presents with    Tachycardia     Pt reports "unfcomfortable" and unsure if she is shot of breath. Reports heart beat was "167"  when taking blood pressure medications tonight.       Patient returns to the emergency department with recurrent concern for palpitations.  Notably the patient was seen here in the department last evening with these concerns and found to have periodic bouts of sinus tachycardia.  Patient denies associated chest pain or shortness of breath.  Fever and chills are denied.      Review of patient's allergies indicates:   Allergen Reactions    Peanut Other (See Comments)     Past Medical History:   Diagnosis Date    Breast cancer, right breast     Cancer     Lymphedema     Thyroid disease      Past Surgical History:   Procedure Laterality Date    BREAST SURGERY      MASTECTOMY       Family History   Problem Relation Name Age of Onset    No Known Problems Mother      No Known Problems Father       Social History     Tobacco Use    Smoking status: Never   Substance Use Topics    Alcohol use: Yes     Alcohol/week: 1.7 standard drinks of alcohol     Types: 2 drink(s) per week     Comment: occasionally    Drug use: No     Review of Systems   Constitutional:  Negative for chills and fever.   HENT:  Negative for congestion and rhinorrhea.    Respiratory:  Negative for cough and shortness of breath.    Cardiovascular:  Positive for palpitations. Negative for chest pain and leg swelling.   Gastrointestinal:  Negative for abdominal pain, diarrhea and vomiting.   Genitourinary:  Negative for difficulty urinating and dysuria.   Skin:  Negative for color change and rash.   Neurological:  Negative for dizziness and light-headedness.   Hematological:  Negative for adenopathy. Does not bruise/bleed easily.     Physical Exam     Initial Vitals [06/11/24 2225]   BP Pulse Resp Temp SpO2   (!) 176/99 (!) 154 16 97.4 °F (36.3 °C) 97 %      MAP     " "  --         Vitals:    06/11/24 2225 06/11/24 2304 06/12/24 0001 06/12/24 0108   BP: (!) 176/99 (!) 169/74     Pulse: (!) 154 91 86 81   Resp: 16 20 17 20   Temp: 97.4 °F (36.3 °C)      TempSrc: Oral      SpO2: 97% 96% 95% 95%   Weight: 74.4 kg (164 lb)      Height: 5' 3" (1.6 m)       06/12/24 0130 06/12/24 0204   BP:  (!) 164/74   Pulse: 80 83   Resp: 20 18   Temp:     TempSrc:     SpO2: 97% 99%   Weight:     Height:       Physical Exam    Nursing note and vitals reviewed.  Constitutional: She appears well-developed and well-nourished. She is not diaphoretic. No distress.   HENT:   Head: Normocephalic and atraumatic.   Nose: Nose normal.   Mouth/Throat: Oropharynx is clear and moist.   Eyes: Conjunctivae are normal. No scleral icterus.   Cardiovascular:  Normal rate, regular rhythm, normal heart sounds and intact distal pulses.           Pulmonary/Chest: Breath sounds normal. No respiratory distress.   Abdominal: Abdomen is soft. She exhibits no distension. There is no abdominal tenderness.   Musculoskeletal:         General: No edema. Normal range of motion.     Neurological: She is alert and oriented to person, place, and time. She has normal strength.   Skin: Skin is warm and dry.         ED Course   Procedures                   MDM  Differential Diagnoses   Based on available history, the working differential diagnoses considered during this evaluation include but are not limited to sinus tachycardia, atrial tachycardia, atrial fibrillation, SVT, ventricular tachycardia, multifocal atrial tachycardia.      LABS     Labs Reviewed   CBC W/ AUTO DIFFERENTIAL - Abnormal; Notable for the following components:       Result Value    RBC 3.88 (*)     Hemoglobin 11.2 (*)     Hematocrit 34.4 (*)     Mono # 1.1 (*)     All other components within normal limits   COMPREHENSIVE METABOLIC PANEL - Abnormal; Notable for the following components:    BUN 31 (*)     ALT 50 (*)     Anion Gap 6 (*)     All other components within " normal limits   TROPONIN I                Imaging     Imaging Results    None                EKG   EKG Readings: (Independently Interpreted)   Initial Reading: No STEMI. Rhythm: Normal Sinus Rhythm. Heart Rate: 99. Ectopy: No Ectopy. Axis: Left Axis Deviation. Clinical Impression: Left Ventricular Hypertrophy (LDH)       ED Management/Discussion     Medications   metoprolol tartrate (LOPRESSOR) tablet 50 mg (50 mg Oral Given 6/12/24 0204)                 The patient's list of active medical problems, social history, medications, and allergies as documented per RN staff has been reviewed.               On final assessment, the patient appears suitable for discharge.  Patient's tachycardia has once again self resolved.  Cardiac monitoring review appears to show sinus tachycardia.  P waves on cardiac monitoring appear to be essentially identical to those seen with the patient is in a normal sinus rhythm suggesting inappropriate sinus tachycardia.  Atrial tachycardia remains within the differential diagnosis.  Aside from palpitations, the patient has not developed any symptoms as a result of this process.  We will initiate a course of metoprolol pending follow-up with cardiology as has been scheduled by her PCP.      I see no indication of an emergent process beyond that addressed during our encounter but have duly counseled the patient/family regarding the need for prompt follow-up as well as the indications that should prompt immediate return to the emergency room.  The patient/family has been provided with language -specific verbal and printed direction regarding our final diagnosis(es) as well as instructions regarding use of OTC and/or Rx medications intended to manage the patient's aforementioned conditions including:  ED Prescriptions       Medication Sig Dispense Start Date End Date Auth. Provider    metoprolol succinate (TOPROL-XL) 25 MG 24 hr tablet Take 1 tablet (25 mg total) by mouth once daily. 30 tablet  6/12/2024 7/12/2024 Breezy Montoya MD              Patient has been advised of the following recommended follow-up instructions:  Follow-up Information       Follow up With Specialties Details Why Contact [facility] - Emergency Dept Emergency Medicine Go to  As needed, If symptoms worsen [address] [facility] [address] [phone]    Cardiology  Go to  As scheduled, for reassessment           The patient/family communicates understanding of all this information and all remaining questions and concerns were addressed at this time.      Referrals:  No orders of the defined types were placed in this encounter.      CLINICAL IMPRESSION    ICD-10-CM ICD-9-CM   1. Sinus tachycardia  R00.0 427.89   2. Palpitations  R00.2 785.1          ED Disposition Condition    Discharge Stable                 Breezy Montoya MD  06/12/24 9228